# Patient Record
Sex: FEMALE | URBAN - METROPOLITAN AREA
[De-identification: names, ages, dates, MRNs, and addresses within clinical notes are randomized per-mention and may not be internally consistent; named-entity substitution may affect disease eponyms.]

---

## 2017-11-22 ENCOUNTER — IMPORTED ENCOUNTER (OUTPATIENT)
Dept: URBAN - METROPOLITAN AREA CLINIC 31 | Facility: CLINIC | Age: 72
End: 2017-11-22

## 2017-11-22 PROBLEM — Z96.1: Noted: 2017-11-22

## 2017-11-22 PROBLEM — H40.1132: Noted: 2017-11-22

## 2017-11-22 PROBLEM — H18.51: Noted: 2017-11-22

## 2017-11-22 PROBLEM — Z94.7: Noted: 2017-11-22

## 2017-11-22 PROCEDURE — 76514 ECHO EXAM OF EYE THICKNESS: CPT

## 2017-11-22 PROCEDURE — 92286 ANT SGM IMG I&R SPECLR MIC: CPT

## 2017-11-22 PROCEDURE — 99204 OFFICE O/P NEW MOD 45 MIN: CPT

## 2017-11-22 NOTE — PATIENT DISCUSSION
1.  Discussed the risks benefits and alternatives of DSEK/DMEK including infection bleeding loss of vision retineal tears detachment primary graft failure rejection and possible need for rebubble. Schedule if desired DMEK OD2. Transplant Doing well after transplant. Continue topical steroids. Rejection and vaccinations reviewed. 3. Pseudophakia OU - IOLs stable. Monitor. 4. Primary Open Angle Glaucoma OU - Continue with current treatment plan. Discussed importance of compliance. Will continue to monitor.

## 2022-04-02 ASSESSMENT — VISUAL ACUITY
OS_SC: 20/50
OD_SC: 20/80-2

## 2022-04-02 ASSESSMENT — TONOMETRY
OD_IOP_MMHG: 14
OS_IOP_MMHG: 9